# Patient Record
Sex: FEMALE | Race: BLACK OR AFRICAN AMERICAN | NOT HISPANIC OR LATINO | Employment: UNEMPLOYED | URBAN - METROPOLITAN AREA
[De-identification: names, ages, dates, MRNs, and addresses within clinical notes are randomized per-mention and may not be internally consistent; named-entity substitution may affect disease eponyms.]

---

## 2018-10-24 ENCOUNTER — HOSPITAL ENCOUNTER (EMERGENCY)
Facility: HOSPITAL | Age: 28
Discharge: HOME | End: 2018-10-24
Attending: EMERGENCY MEDICINE
Payer: MEDICAID

## 2018-10-24 VITALS
WEIGHT: 174 LBS | RESPIRATION RATE: 18 BRPM | TEMPERATURE: 98 F | OXYGEN SATURATION: 100 % | SYSTOLIC BLOOD PRESSURE: 135 MMHG | DIASTOLIC BLOOD PRESSURE: 77 MMHG | HEART RATE: 97 BPM

## 2018-10-24 DIAGNOSIS — S09.90XA CLOSED HEAD INJURY, INITIAL ENCOUNTER: Primary | ICD-10-CM

## 2018-10-24 DIAGNOSIS — S00.81XA ABRASION OF FACE, INITIAL ENCOUNTER: ICD-10-CM

## 2018-10-24 PROCEDURE — 99282 EMERGENCY DEPT VISIT SF MDM: CPT | Mod: ,,, | Performed by: EMERGENCY MEDICINE

## 2018-10-24 PROCEDURE — 99282 EMERGENCY DEPT VISIT SF MDM: CPT

## 2018-10-24 NOTE — ED NOTES
Patient identifiers verified and correct for Ms Ramos  C/C: Head Injury Saturday  APPEARANCE: awake and alert in NAD.  SKIN: warm, dry and intact. No breakdown or bruising.  MUSCULOSKELETAL: Patient moving all extremities spontaneously, no obvious swelling or deformities noted. Ambulates independently.  RESPIRATORY: Denies shortness of breath.Respirations unlabored.   CARDIAC: Denies CP, 2+ distal pulses; no peripheral edema  ABDOMEN: S/ND/NT, Denies nausea  : voids spontaneously, denies difficulty  Neurologic: AAO x 4; follows commands equal strength in all extremities; denies numbness/tingling. Denies dizziness Denies headache or nausea,

## 2018-10-24 NOTE — ED PROVIDER NOTES
Encounter Date: 10/24/2018    SCRIBE #1 NOTE: I, Hernandez Sparrow, am scribing for, and in the presence of,  Dr. Cuevas. I have scribed the entire note.       History     Chief Complaint   Patient presents with    Head Injury     hit left side of head on car door saturday, denies LOC, currently reports pain to site. Denies being on blood thinners.      The patient is a 28 y.o. female with no known co-morbidities, who presents to the ED with a complaint of headache after hitting her head on her car door 4 days ago. She notes a small abrasion above her left eyebrow. She denies LOC but states she felt lightheaded for a few minutes immediately after.         The history is provided by the patient.     Review of patient's allergies indicates:  Allergies not on file  No past medical history on file.  No past surgical history on file.  No family history on file.  Social History     Tobacco Use    Smoking status: Not on file   Substance Use Topics    Alcohol use: Not on file    Drug use: Not on file     Review of Systems   Constitutional: Negative for fever.   HENT: Negative for sore throat.    Eyes: Negative for visual disturbance.   Respiratory: Negative for shortness of breath.    Cardiovascular: Negative for chest pain.   Gastrointestinal: Negative for nausea.   Genitourinary: Negative for dysuria.   Musculoskeletal: Negative for back pain.   Skin: Negative for rash.   Neurological: Positive for headaches. Negative for weakness and light-headedness (resolved).       Physical Exam     Initial Vitals [10/24/18 1701]   BP Pulse Resp Temp SpO2   135/77 97 18 97.7 °F (36.5 °C) 100 %      MAP       --         Physical Exam    Nursing note and vitals reviewed.  Constitutional: She appears well-developed and well-nourished. No distress.   HENT:   Head: Normocephalic and atraumatic.   Mouth/Throat: Oropharynx is clear and moist.   Eyes: Conjunctivae are normal.   Neck: Normal range of motion.   Cardiovascular: Normal rate,  regular rhythm and normal heart sounds.   Pulmonary/Chest: Breath sounds normal. No respiratory distress.   Abdominal: Soft. She exhibits no distension. There is no tenderness.   Musculoskeletal: Normal range of motion.   Neurological: She is alert and oriented to person, place, and time.   Skin: Skin is warm and dry.   0.5 cm abrasion above left eyebrow         ED Course   Procedures  Labs Reviewed - No data to display       Imaging Results    None          Medical Decision Making:   History:   Old Medical Records: I decided to obtain old medical records.  ED Management:  Evaluation of low mechanism closed hit injury with small facial abrasion. No indication of imaging. Recommended motrin and tylenol as needed. Discharged in stable condition.             Scribe Attestation:   Scribe #1: I performed the above scribed service and the documentation accurately describes the services I performed. I attest to the accuracy of the note.               Clinical Impression:   The primary encounter diagnosis was Closed head injury, initial encounter. A diagnosis of Abrasion of face, initial encounter was also pertinent to this visit.      Disposition:   Disposition: Discharged  Condition: Stable                        Usha Cuevas MD  10/24/18 2746

## 2018-10-24 NOTE — ED NOTES
Discharge home, states understanding to follow up as directed. Ambulates out of ED without difficulty.

## 2019-02-06 ENCOUNTER — OFFICE VISIT (OUTPATIENT)
Dept: OTOLARYNGOLOGY | Facility: CLINIC | Age: 29
End: 2019-02-06
Payer: MEDICAID

## 2019-02-06 VITALS
WEIGHT: 171.94 LBS | HEART RATE: 100 BPM | HEIGHT: 61 IN | DIASTOLIC BLOOD PRESSURE: 89 MMHG | BODY MASS INDEX: 32.46 KG/M2 | SYSTOLIC BLOOD PRESSURE: 138 MMHG

## 2019-02-06 DIAGNOSIS — J30.9 ALLERGIC RHINITIS, UNSPECIFIED SEASONALITY, UNSPECIFIED TRIGGER: ICD-10-CM

## 2019-02-06 DIAGNOSIS — K21.9 LARYNGOPHARYNGEAL REFLUX (LPR): ICD-10-CM

## 2019-02-06 DIAGNOSIS — J35.2 ADENOID HYPERTROPHY: ICD-10-CM

## 2019-02-06 DIAGNOSIS — R51.9 FACIAL PAIN: Primary | ICD-10-CM

## 2019-02-06 PROCEDURE — 99205 PR OFFICE/OUTPT VISIT, NEW, LEVL V, 60-74 MIN: ICD-10-PCS | Mod: 25,S$PBB,, | Performed by: OTOLARYNGOLOGY

## 2019-02-06 PROCEDURE — 99999 PR PBB SHADOW E&M-EST. PATIENT-LVL III: CPT | Mod: PBBFAC,,, | Performed by: OTOLARYNGOLOGY

## 2019-02-06 PROCEDURE — 99999 PR PBB SHADOW E&M-EST. PATIENT-LVL III: ICD-10-PCS | Mod: PBBFAC,,, | Performed by: OTOLARYNGOLOGY

## 2019-02-06 PROCEDURE — 31575 DIAGNOSTIC LARYNGOSCOPY: CPT | Mod: PBBFAC | Performed by: OTOLARYNGOLOGY

## 2019-02-06 PROCEDURE — 99213 OFFICE O/P EST LOW 20 MIN: CPT | Mod: PBBFAC | Performed by: OTOLARYNGOLOGY

## 2019-02-06 PROCEDURE — 31575 PR LARYNGOSCOPY, FLEXIBLE; DIAGNOSTIC: ICD-10-PCS | Mod: S$PBB,,, | Performed by: OTOLARYNGOLOGY

## 2019-02-06 PROCEDURE — 99205 OFFICE O/P NEW HI 60 MIN: CPT | Mod: 25,S$PBB,, | Performed by: OTOLARYNGOLOGY

## 2019-02-06 PROCEDURE — 31575 DIAGNOSTIC LARYNGOSCOPY: CPT | Mod: S$PBB,,, | Performed by: OTOLARYNGOLOGY

## 2019-02-07 NOTE — CONSULTS
Ms. Azevedo presents for consultation.    VITAL SIGNS:  Per nurses' notes.    CHIEF COMPLAINT:  Coughing up mucus with blood.    HISTORY OF PRESENT ILLNESS:  This is a 29-year-old black female who states for   the last three months, she has had a mucus build up with postnasal drip and has   been coughing up mucus with blood along with pressure under her eyes and a   positive globus sensation.  She has been treated with several different rounds   of antibiotics as well as steroids.  She is using Flonase.  She is also on   antihistamines.  She states that none of this has helped.  She has not done any   sinus rinses.  Her nasal airway is clear.  She does have positive allergy   symptoms with sneezing, watery itchy eyes, though she has not been evaluated by   an allergist.  Additional symptoms include nasal congestion, the facial pressure   pain and persistent sinus infection.  She lists no other medical or surgical   history.  No family history listed.  She is a nonsmoker, nondrinker.    REVIEW OF SYSTEMS:  CONSTITUTIONAL:  Weight loss or weight gain:  Negative.  ALLERGY/IMMUNOLOGIC:  Negative.  ENT/Mouth:  Hearing Loss/Dizziness/Tinnitus:  Negative.  Ear Infections/Otalgia:  Negative.  Rhinitis/Sinusitis/Epistaxis:  Negative.  Headache/Facial Pain:  Negative.  Nasal Obstruction/Snoring/NILES:  Negative.  Throat: Infections/Pain:  Negative.  Hoarseness/Speech Disturbance:  Negative.  Salivary Glands Disorder:  Negative.  Trauma:  Negative.    Cardiovascular:  M/I Angina:  Negative.  Hypertension:  Negative.  Endo:  DM/Steroids:  Negative.  Eyes:  Negative.  GI: Dysphagia/Reflux:  Negative.  : GYN Pregnancy:  Negative.      Renal:  Dialysis:  Negative.  Lymph:  Neck Mass/Lymphadenopathy:  Negative.  Musculoskeletal:  Negative.  Hem:  Bleeding Disorders/Anemia:  Negative.  Neuro:  Cranial/Neuralgia:  Negative.  Pulm:  Asthma/SOB/Cough:  Negative.  Skin/Breast:  Negative.    PAST MEDICAL/FAMILY/SOCIAL HISTORY:    Past  Medical History:   ENT Surgery:  Negative.   Occupational Exposure:  Negative.    Problems:  Negative.   Cancer:  Negative.    Past Family History:   Family history hearing loss:  Negative.   Family history cancer:  Negative.    Past Social History:   Tobacco:  Negative.   Alcohol:  Negative.    MEDICATIONS:  Per MedCard.    ALLERGIES:  Per MedCard.    PHYSICAL EXAMINATION:  General Appearance:  Well-developed, well-nourished 29-year-old black female in   no apparent distress.  Communication Ability:  Good.  EARS, NOSE, THROAT, MOUTH;  EARS:   External auditory canals:  Clear.   Hearing:  Grossly Intact.   Tympanic membranes:  Clear.  NOSE:   External:  Shows good valvular support.   Intranasal:  Septum is straight.  Turbinates are 1 to 2+.  Exam with flexible   fiberoptic scope #[id number] reveals no intranasal polyps or purulence.  Airway is   clear.  She does have 1-2+ adenoid hypertrophy.  MOUTH:   Intraorally:  Lips, teeth and gums: Normal.   Oropharynx:  Normal.   Mucosa:  Normal.  THROAT:   Tongue:  Normal.   Palate:  She has slightly elongated.  Her aryepiglottic folds do show some   mild-to-moderate edema and erythema consistent with LPR.  Vocal cords show   normal mobility.  No masses or lesions noted.   Tonsils:  1+.   Posterior pharynx:  Normal.  HEAD/FACE INSPECTION:  Normal and atraumatic.   Palpation/Percussion:  She is tender to percussion in the ethmoid and   particularly the maxillary regions bilaterally.   Facial Strength:  Normal and symmetric.   Salivary glands:  Normal.    NECK:  Supple.  THYROID:  No masses.  LYMPHATICS:  No nodes.  RESPIRATORY:   Effort:  Normal.  EYES:   Ocular Mobility:  Normal.   Vision:  Grossly intact.  NEURO/PSYCH:   Cranial nerves:  2-12 grossly intact.   Orientation:  x3.   Mood/Affect: Normal.    IMPRESSION:  A 29-year-old black female with chronic facial pressure pains as   well as coughing up bloody mucus for the last three months despite antibiotic   and  steroid treatments.    RECOMMENDATION:  I discussed my findings with her in detail as well as my   recommendations for treatment.  I have suggested sinus rinses utilizing   distilled water and I have given her a literature on this.  I have also asked   her to continue with her Flonase.  I have suggested discontinuing her   antihistamines.  We will order a CT scan of her sinuses for evaluation.  She   will contact us via the portal to arrange for followup for this.  I have also   given her reflux precautions and discussed the use of trial period of omeprazole   for this.  I suggested she followup for this with her primary or GI doctor.    Again, she will contact us after her scan is completed to arrange for followup   of this.      HG/HN  dd: 02/06/2019 09:34:49 (CST)  td: 02/07/2019 03:07:03 (CST)  Doc ID   #[id number]  Job ID #[id number]    CC:

## 2021-12-26 ENCOUNTER — HOSPITAL ENCOUNTER (EMERGENCY)
Facility: HOSPITAL | Age: 31
Discharge: HOME | End: 2021-12-26
Attending: EMERGENCY MEDICINE
Payer: MEDICAID

## 2021-12-26 VITALS
OXYGEN SATURATION: 100 % | DIASTOLIC BLOOD PRESSURE: 84 MMHG | WEIGHT: 145 LBS | SYSTOLIC BLOOD PRESSURE: 143 MMHG | TEMPERATURE: 99 F | RESPIRATION RATE: 17 BRPM | HEART RATE: 80 BPM | HEIGHT: 63 IN | BODY MASS INDEX: 25.69 KG/M2

## 2021-12-26 DIAGNOSIS — U07.1 COVID: Primary | ICD-10-CM

## 2021-12-26 LAB
CTP QC/QA: YES
SARS-COV-2 RDRP RESP QL NAA+PROBE: POSITIVE

## 2021-12-26 PROCEDURE — U0002 COVID-19 LAB TEST NON-CDC: HCPCS | Performed by: EMERGENCY MEDICINE

## 2021-12-26 PROCEDURE — 99283 EMERGENCY DEPT VISIT LOW MDM: CPT | Mod: CR,CS,, | Performed by: EMERGENCY MEDICINE

## 2021-12-26 PROCEDURE — 99282 EMERGENCY DEPT VISIT SF MDM: CPT | Mod: 25

## 2021-12-26 PROCEDURE — 99283 PR EMERGENCY DEPT VISIT,LEVEL III: ICD-10-PCS | Mod: CR,CS,, | Performed by: EMERGENCY MEDICINE

## 2021-12-26 NOTE — Clinical Note
"Jayla "Reynaldo Azevedo was seen and treated in our emergency department on 12/26/2021.     COVID-19 is present in our communities across the state. There is limited testing for COVID at this time, so not all patients can be tested. In this situation, your employee meets the following criteria:    Jayla Azevedo has met the criteria for COVID-19 testing and has a POSITIVE result. She can return to work once they are asymptomatic for 72 hours without the use of fever reducing medications AND at least ten days from the first positive result.     If you have any questions or concerns, or if I can be of further assistance, please do not hesitate to contact me.    Sincerely,             Janene Hernandez MD"

## 2021-12-26 NOTE — ED PROVIDER NOTES
"Date of service: 12/26/2021  Time: 4:52 PM    CC:  Chief Complaint   Patient presents with    COVID-19 Concerns     + exposure. Mom and sister tested +        HPI:    31 y.o. female presents with no symptoms but multiple exposures to covid from people she lives with. They have not been tested for COVID yet.    Vaccination status: unvaccinated  Associated symptoms: none  Exacerbating factors: none  Relieving factors: none.  Relevant comorbidities: none      ROS:  Review of Systems   Constitutional: Negative for chills, fever and malaise/fatigue.   HENT: Negative for congestion and sore throat.    Eyes: Negative for blurred vision, pain, discharge and redness.   Respiratory: Negative for cough and shortness of breath.    Cardiovascular: Negative for chest pain.   Gastrointestinal: Negative for abdominal pain, diarrhea, heartburn, melena, nausea and vomiting.   Musculoskeletal: Negative for myalgias.   Neurological: Negative for dizziness, seizures, loss of consciousness, weakness and headaches.       PMH/PSH:  No past medical history on file.  No past surgical history on file.    MEDS  No current facility-administered medications for this encounter.  No current outpatient medications on file.    Allergies:  Review of patient's allergies indicates:  No Known Allergies    PHYSICAL EXAM:  BP (!) 143/84 (BP Location: Left arm, Patient Position: Sitting)   Pulse 80   Temp 99.4 °F (37.4 °C) (Oral)   Resp 17   Ht 5' 3" (1.6 m)   Wt 65.8 kg (145 lb)   SpO2 100%   BMI 25.69 kg/m²   Gen: AxOx4, NAD, appears stated age, well appearing  Eye: EOMI, no scleral icterus, no periorbital edema or ecchymosis  Head: Normocephalic, atraumatic, no lesions, scalp grossly normal  ENT: neck supple, no stridor, no masses, no abnormal phonation or drooling  CVS: warm and well perfused, cap refill <2 seconds, no extremity pallor  PULM: normal work of breathing, no stridor, equal chest rise, no peripheral cyanosis, unable to auscultate " lungs 2/2 poor quality precautions stethoscope  ABD: scaphoid, nondistended  Ext: no rash, no deformities, moving all joints with normal ROM  Neuro: PAVON, gait intact, face grossly symmetric  Psych: well groomed, mood and affect congruent, makes good eye contact, cooperative      WORKUP:  Labs Reviewed   SARS-COV-2 RDRP GENE - Abnormal; Notable for the following components:       Result Value    POC Rapid COVID Positive (*)     All other components within normal limits    Narrative:     This test utilizes isothermal nucleic acid amplification   technology to detect the SARS-CoV-2 RdRp nucleic acid segment.   The analytical sensitivity (limit of detection) is 125 genome   equivalents/mL.   A POSITIVE result implies infection with the SARS-CoV-2 virus;   the patient is presumed to be contagious.     A NEGATIVE result means that SARS-CoV-2 nucleic acids are not   present above the limit of detection. A NEGATIVE result should be   treated as presumptive. It does not rule out the possibility of   COVID-19 and should not be the sole basis for treatment decisions.   If COVID-19 is strongly suspected based on clinical and exposure   history, re-testing using an alternate molecular assay should be   considered.   This test is only for use under the Food and Drug   Administration s Emergency Use Authorization (EUA).   Commercial kits are provided by [employer].   Performance characteristics of the EUA have been independently   verified by [facility] Department of   Pathology and Laboratory Medicine.   _________________________________________________________________   The authorized Fact Sheet for Healthcare Providers and the authorized Fact   Sheet for Patients of the ID NOW COVID-19 are available on the FDA   website:     https://www.fda.gov/media/091566/download  https://www.fda.gov/media/742757/download           No orders to display         MDM:   I decided to obtain medical records from prior clinic  visits and hospitalizations.     Patient's complaint concerning for COVID infection vs well state. Patient evaluated during a surge of the ongoing coronavirus pandemic.  .      They are well appearing, hemodynamically stable, without increased work of breathing.  No severe hypoxia to necessitate hospitalization, including with ambulation.    Labs notable for positive covid.        Discharge plan:       Patient was counseled regarding respiratory supportive care measures.I have enrolled them in the covid monitoring program, and provided them with a home pulse oximeter, and instructed on proper use - specifically to check home pulse ox at least BID and to return if sats at rest or with ambulation are consistently below 93% (our current hospitalization criteria).     I discussed Monocolonal antibody infusion with the patient given they meet criteria, and placed the referral.     Patient advised to self quarantine, wear mask in public, perform hand hygiene, etc.    Discharged home in stable condition. Return precautions discussed at bedside.    Final diagnoses:  [U07.1] COVID (Primary)       ED Disposition Condition    Discharge Stable               Janene Hernandez MD  12/26/21 2974

## 2021-12-26 NOTE — ED NOTES
Jayla Azevedo, a 31 y.o. female presents to the ED w/ complaint of COVID-19. Pt states family members in house hold started having symptoms yesterday and tested positive today; came in to be tested, asymptomatic.     Triage note:  Chief Complaint   Patient presents with    COVID-19 Concerns     + exposure. Mom and sister tested +      Review of patient's allergies indicates:  No Known Allergies  No past medical history on file.      LOC: The patient is awake, alert, and oriented to self, place, time, and situation. Pt is calm and cooperative. Affect is appropriate.  Speech is appropriate and clear.     APPEARANCE: Patient resting comfortably in no acute distress.  Patient is clean and well groomed.    SKIN: The skin is warm and dry; color consistent with ethnicity.  Patient has normal skin turgor and moist mucus membranes.  Skin intact; no breakdown or bruising noted.     MUSCULOSKELETAL: Patient moving upper and lower extremities without difficulty; denies pain in the extremities or back.  Denies weakness.     RESPIRATORY: Airway is open and patent. Respirations spontaneous, even, easy, and non-labored.  Patient has a normal effort and rate.  No accessory muscle use noted. Denies cough.     CARDIAC:  Normal rate noted.  No peripheral edema noted. No complaints of chest pain.     ABDOMEN: Soft and non tender to palpation.  No distention noted. Pt denies abdominal pain; denies nausea, vomiting, diarrhea, or constipation.    NEUROLOGIC: Eyes open spontaneously.  Behavior appropriate to situation.  Follows commands; facial expression symmetrical.  Purposeful motor response noted; normal sensation in all extremities. Pt denies headache; denies lightheadedness or dizziness; denies visual disturbances; denies loss of balance; denies unilateral weakness.

## 2023-01-23 ENCOUNTER — OFFICE VISIT (OUTPATIENT)
Dept: PRIMARY CARE CLINIC | Facility: CLINIC | Age: 33
End: 2023-01-23
Payer: MEDICAID

## 2023-01-23 DIAGNOSIS — Z00.00 ANNUAL PHYSICAL EXAM: Primary | ICD-10-CM

## 2023-01-23 DIAGNOSIS — J30.9 ALLERGIC RHINITIS, UNSPECIFIED SEASONALITY, UNSPECIFIED TRIGGER: ICD-10-CM

## 2023-01-23 PROCEDURE — 1160F RVW MEDS BY RX/DR IN RCRD: CPT | Mod: CPTII,95,, | Performed by: FAMILY MEDICINE

## 2023-01-23 PROCEDURE — 1159F PR MEDICATION LIST DOCUMENTED IN MEDICAL RECORD: ICD-10-PCS | Mod: CPTII,95,, | Performed by: FAMILY MEDICINE

## 2023-01-23 PROCEDURE — 99385 PR PREVENTIVE VISIT,NEW,18-39: ICD-10-PCS | Mod: 95,,, | Performed by: FAMILY MEDICINE

## 2023-01-23 PROCEDURE — 99385 PREV VISIT NEW AGE 18-39: CPT | Mod: 95,,, | Performed by: FAMILY MEDICINE

## 2023-01-23 PROCEDURE — 1160F PR REVIEW ALL MEDS BY PRESCRIBER/CLIN PHARMACIST DOCUMENTED: ICD-10-PCS | Mod: CPTII,95,, | Performed by: FAMILY MEDICINE

## 2023-01-23 PROCEDURE — 1159F MED LIST DOCD IN RCRD: CPT | Mod: CPTII,95,, | Performed by: FAMILY MEDICINE

## 2023-01-23 RX ORDER — FLUTICASONE PROPIONATE 50 MCG
2 SPRAY, SUSPENSION (ML) NASAL 2 TIMES DAILY PRN
Qty: 16 G | Refills: 2 | Status: SHIPPED | OUTPATIENT
Start: 2023-01-23 | End: 2023-11-02

## 2023-01-23 RX ORDER — LORATADINE 10 MG/1
10 TABLET ORAL DAILY
Qty: 90 TABLET | Refills: 2 | Status: SHIPPED | OUTPATIENT
Start: 2023-01-23 | End: 2024-01-23

## 2023-01-23 NOTE — PROGRESS NOTES
The patient location is: [facility]  The chief complaint leading to consultation is:  Establish care    Visit type: audiovisual    Face to Face time with patient: 10   minutes of total time spent on the encounter, which includes face to face time and non-face to face time preparing to see the patient (eg, review of tests), Obtaining and/or reviewing separately obtained history, Documenting clinical information in the electronic or other health record, Independently interpreting results (not separately reported) and communicating results to the patient/family/caregiver, or Care coordination (not separately reported).         Each patient to whom he or she provides medical services by telemedicine is:  (1) informed of the relationship between the physician and patient and the respective role of any other health care provider with respect to management of the patient; and (2) notified that he or she may decline to receive medical services by telemedicine and may withdraw from such care at any time.    Notes:     Clinic Note  1/23/2023      Subjective:       Patient ID:  Jayla is a 32 y.o. female being seen for an new visit.      Chief Complaint:  Establish care     Establish care-patient with a history of allergic rhinitis here to establish care.  Has not had a Pap smear in a long time    Allergic rhinitis-takes Claritin and Flonase for this which helps.  Last week had an upper URI which has since resolved      No family history on file.  Social History     Socioeconomic History    Marital status: Single   Tobacco Use    Smoking status: Never    Smokeless tobacco: Never   Substance and Sexual Activity    Alcohol use: No    Drug use: No     No past surgical history on file.    There is no problem list on file for this patient.    Review of Systems   Constitutional:  Negative for chills, fever, malaise/fatigue and weight loss.   HENT:  Negative for congestion, sinus pain and sore throat.    Respiratory:  Negative for  "cough, shortness of breath and wheezing.    Cardiovascular:  Negative for chest pain and palpitations.   Gastrointestinal:  Negative for constipation, diarrhea, nausea and vomiting.   Genitourinary:  Negative for dysuria, frequency and urgency.   Musculoskeletal:  Negative for myalgias.   Skin:  Negative for rash.   Neurological:  Negative for headaches.       Objective:      There were no vitals taken for this visit.  Estimated body mass index is 25.69 kg/m² as calculated from the following:    Height as of 12/26/21: 5' 3" (1.6 m).    Weight as of 12/26/21: 65.8 kg (145 lb).  Physical Exam  Constitutional:       General: She is not in acute distress.     Appearance: She is not diaphoretic.   HENT:      Head: Normocephalic and atraumatic.   Eyes:      Conjunctiva/sclera: Conjunctivae normal.   Pulmonary:      Effort: Pulmonary effort is normal.   Musculoskeletal:         General: Normal range of motion.   Neurological:      Mental Status: She is alert and oriented to person, place, and time.   Psychiatric:         Mood and Affect: Mood and affect normal.         Cognition and Memory: Memory normal.         Judgment: Judgment normal.         Assessment and Plan:     1. Annual physical exam  - patient will schedule another visit for well-woman exam    2. Allergic rhinitis, unspecified seasonality, unspecified trigger  - loratadine (CLARITIN) 10 mg tablet; Take 1 tablet (10 mg total) by mouth once daily.  Dispense: 90 tablet; Refill: 2  - fluticasone propionate (FLONASE) 50 mcg/actuation nasal spray; 2 sprays (100 mcg total) by Each Nostril route 2 (two) times daily as needed for Rhinitis or Allergies.  Dispense: 16 g; Refill: 2        Follow up:   No follow-ups on file.     Other Orders Placed This Visit:  No orders of the defined types were placed in this encounter.          Chris Ley MD        This note is dictated on M*Modal word recognition program.  There are word recognition mistakes that are occasionally " missed on review.

## 2023-04-12 ENCOUNTER — PATIENT MESSAGE (OUTPATIENT)
Dept: ADMINISTRATIVE | Facility: HOSPITAL | Age: 33
End: 2023-04-12
Payer: MEDICAID

## 2023-08-11 ENCOUNTER — OFFICE VISIT (OUTPATIENT)
Dept: PRIMARY CARE CLINIC | Facility: CLINIC | Age: 33
End: 2023-08-11
Payer: MEDICAID

## 2023-08-11 DIAGNOSIS — G44.209 TENSION HEADACHE: ICD-10-CM

## 2023-08-11 DIAGNOSIS — G47.00 INSOMNIA, UNSPECIFIED TYPE: Primary | ICD-10-CM

## 2023-08-11 PROCEDURE — 99214 OFFICE O/P EST MOD 30 MIN: CPT | Mod: 95,,, | Performed by: FAMILY MEDICINE

## 2023-08-11 PROCEDURE — 99214 PR OFFICE/OUTPT VISIT, EST, LEVL IV, 30-39 MIN: ICD-10-PCS | Mod: 95,,, | Performed by: FAMILY MEDICINE

## 2023-08-11 RX ORDER — TRAZODONE HYDROCHLORIDE 50 MG/1
50 TABLET ORAL NIGHTLY
Qty: 60 TABLET | Refills: 1 | Status: SHIPPED | OUTPATIENT
Start: 2023-08-11 | End: 2023-10-06

## 2023-08-11 NOTE — PROGRESS NOTES
The patient location is: work  The chief complaint leading to consultation is: headaches and insomnia    Visit type: audiovisual    Face to Face time with patient: 20 minutes of total time spent on the encounter, which includes face to face time and non-face to face time preparing to see the patient (eg, review of tests), Obtaining and/or reviewing separately obtained history, Documenting clinical information in the electronic or other health record, Independently interpreting results (not separately reported) and communicating results to the patient/family/caregiver, or Care coordination (not separately reported).         Each patient to whom he or she provides medical services by telemedicine is:  (1) informed of the relationship between the physician and patient and the respective role of any other health care provider with respect to management of the patient; and (2) notified that he or she may decline to receive medical services by telemedicine and may withdraw from such care at any time.    Notes:         Clinic Note  8/11/2023      Subjective:       Patient ID:  Jayla is a 33 y.o. female being seen for an established visit.    Chief Complaint:  Headaches and insomnia     Headaches-patient reports waking up with a headache daily over the last week.  Symptoms will usually improve after taking Tylenol or ibuprofen and lying down.  Reports very mild/few intermittent headaches during the day.  Headaches described as anterior bilaterally.  Denies photophobia, phonophobia, nausea, vomiting.  Does not exercise, has been sleeping poorly does have chronic allergic rhinitis and takes Claritin and Flonase, no increased nasal congestion.  Denies any headaches with movement/motion, sneezing, coughing, no numbness or tingling weakness    Insomnia-has been having significant insomnia over the last several months.  Will try to go to bed at 10:30 p.m. every day and will have to wake up at 4:00 a.m. in the morning for work.   "Has trouble with falling asleep and staying asleep.  On the weekends will wake up at 7:00 a.m..  Does not take naps during the day.  Infrequently drinks coffee.  Does not drink tea or soda.  Does watch TV and use her phone prior to going to bed.  Melatonin not helpful.  Not stressed or anxious.  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   HENT:  Negative for congestion, sinus pain and sore throat.    Respiratory:  Negative for cough, shortness of breath and wheezing.    Cardiovascular:  Negative for chest pain and palpitations.   Gastrointestinal:  Negative for constipation, diarrhea, nausea and vomiting.   Genitourinary:  Negative for dysuria, frequency and urgency.   Musculoskeletal:  Negative for myalgias.   Skin:  Negative for rash.   Neurological:  Positive for headaches. Negative for dizziness, tingling, tremors, sensory change, speech change, focal weakness, seizures, loss of consciousness and weakness.   Psychiatric/Behavioral:  Negative for depression and hallucinations. The patient has insomnia. The patient is not nervous/anxious.        Medication List with Changes/Refills   New Medications    TRAZODONE (DESYREL) 50 MG TABLET    Take 1 tablet (50 mg total) by mouth every evening.   Current Medications    FLUTICASONE PROPIONATE (FLONASE) 50 MCG/ACTUATION NASAL SPRAY    2 sprays (100 mcg total) by Each Nostril route 2 (two) times daily as needed for Rhinitis or Allergies.    LORATADINE (CLARITIN) 10 MG TABLET    Take 1 tablet (10 mg total) by mouth once daily.       There is no problem list on file for this patient.          Objective:      There were no vitals taken for this visit.  Estimated body mass index is 25.69 kg/m² as calculated from the following:    Height as of 12/26/21: 5' 3" (1.6 m).    Weight as of 12/26/21: 65.8 kg (145 lb).  Physical Exam  Constitutional:       " General: She is not in acute distress.     Appearance: She is not diaphoretic.   HENT:      Head: Normocephalic and atraumatic.   Eyes:      Conjunctiva/sclera: Conjunctivae normal.   Pulmonary:      Effort: Pulmonary effort is normal.   Musculoskeletal:         General: Normal range of motion.   Neurological:      Mental Status: She is alert and oriented to person, place, and time.   Psychiatric:         Mood and Affect: Mood and affect normal.         Behavior: Behavior normal.         Thought Content: Thought content normal.         Cognition and Memory: Memory normal.         Judgment: Judgment normal.           Assessment and Plan:     1. Insomnia, unspecified type  - discussed importance of sleep hygiene, trying to wake up at the same time every morning, going to bed earlier in the night so that she can get at least 7 hours of sleep, avoiding screen time prior to bedtime, exercising.  Start trazodone 50 mg for now, can titrate up as needed  - traZODone (DESYREL) 50 MG tablet; Take 1 tablet (50 mg total) by mouth every evening.  Dispense: 60 tablet; Refill: 1    2. Tension headache  - discussed with patient likely tension headaches in etiology.  Recommend Tylenol/ibuprofen p.r.n., can also do a trial of Excedrin.  No red flags on history exam, will monitor for now      Follow Up:   No follow-ups on file.    Other Orders Placed This Visit:  No orders of the defined types were placed in this encounter.        Chris Ley MD        This note is dictated on M*Modal word recognition program.  There are word recognition mistakes that are occasionally missed on review.

## 2023-10-03 ENCOUNTER — PATIENT MESSAGE (OUTPATIENT)
Dept: PRIMARY CARE CLINIC | Facility: CLINIC | Age: 33
End: 2023-10-03
Payer: MEDICAID

## 2023-10-06 ENCOUNTER — OFFICE VISIT (OUTPATIENT)
Dept: PRIMARY CARE CLINIC | Facility: CLINIC | Age: 33
End: 2023-10-06
Payer: MEDICAID

## 2023-10-06 DIAGNOSIS — R05.9 COUGH, UNSPECIFIED TYPE: ICD-10-CM

## 2023-10-06 DIAGNOSIS — G47.00 INSOMNIA, UNSPECIFIED TYPE: Primary | ICD-10-CM

## 2023-10-06 PROCEDURE — 99213 OFFICE O/P EST LOW 20 MIN: CPT | Mod: 95,,, | Performed by: FAMILY MEDICINE

## 2023-10-06 PROCEDURE — 99213 PR OFFICE/OUTPT VISIT, EST, LEVL III, 20-29 MIN: ICD-10-PCS | Mod: 95,,, | Performed by: FAMILY MEDICINE

## 2023-10-06 RX ORDER — PROMETHAZINE HYDROCHLORIDE AND DEXTROMETHORPHAN HYDROBROMIDE 6.25; 15 MG/5ML; MG/5ML
5 SYRUP ORAL EVERY 4 HOURS PRN
Qty: 118 ML | Refills: 0 | Status: SHIPPED | OUTPATIENT
Start: 2023-10-06 | End: 2023-10-16

## 2023-10-06 RX ORDER — TRAZODONE HYDROCHLORIDE 150 MG/1
150 TABLET ORAL NIGHTLY
Qty: 90 TABLET | Refills: 3 | Status: SHIPPED | OUTPATIENT
Start: 2023-10-06 | End: 2024-10-05

## 2023-10-06 NOTE — PROGRESS NOTES
The patient location is: [place]  The chief complaint leading to consultation is: sleep    Visit type: audiovisual    Face to Face time with patient: 15 minutes of total time spent on the encounter, which includes face to face time and non-face to face time preparing to see the patient (eg, review of tests), Obtaining and/or reviewing separately obtained history, Documenting clinical information in the electronic or other health record, Independently interpreting results (not separately reported) and communicating results to the patient/family/caregiver, or Care coordination (not separately reported).         Each patient to whom he or she provides medical services by telemedicine is:  (1) informed of the relationship between the physician and patient and the respective role of any other health care provider with respect to management of the patient; and (2) notified that he or she may decline to receive medical services by telemedicine and may withdraw from such care at any time.    Notes:       Clinic Note  10/6/2023      Subjective:       Patient ID:  Jayla is a 33 y.o. female being seen for an established visit.    Chief Complaint:  Sleep    Insomnia-currently taking trazodone q.h.s. which does help with falling asleep and staying asleep.  Would like to increase the dose if possible.    Cough-reports having cough over the last 2 weeks along with rhinorrhea and congestion.  Patient does have chronic allergic rhinitis and using her Flonase and loratadine.  Her cough overall is improving, however did have significant cough episode last night that woke her up from sleeping    Review of Systems   Constitutional:  Negative for chills, fever, malaise/fatigue and weight loss.   HENT:  Negative for congestion, sinus pain and sore throat.    Respiratory:  Positive for cough. Negative for shortness of breath and wheezing.    Cardiovascular:  Negative for chest pain and palpitations.   Gastrointestinal:  Negative for  "constipation, diarrhea, nausea and vomiting.   Genitourinary:  Negative for dysuria, frequency and urgency.   Musculoskeletal:  Negative for myalgias.   Skin:  Negative for rash.   Neurological:  Negative for headaches.   Psychiatric/Behavioral:  The patient has insomnia.        Medication List with Changes/Refills   New Medications    PROMETHAZINE-DEXTROMETHORPHAN (PROMETHAZINE-DM) 6.25-15 MG/5 ML SYRP    Take 5 mLs by mouth every 4 (four) hours as needed (cough).    TRAZODONE (DESYREL) 150 MG TABLET    Take 1 tablet (150 mg total) by mouth every evening.   Current Medications    FLUTICASONE PROPIONATE (FLONASE) 50 MCG/ACTUATION NASAL SPRAY    2 sprays (100 mcg total) by Each Nostril route 2 (two) times daily as needed for Rhinitis or Allergies.    LORATADINE (CLARITIN) 10 MG TABLET    Take 1 tablet (10 mg total) by mouth once daily.   Discontinued Medications    TRAZODONE (DESYREL) 50 MG TABLET    Take 1 tablet (50 mg total) by mouth every evening.       There is no problem list on file for this patient.          Objective:      There were no vitals taken for this visit.  Estimated body mass index is 25.69 kg/m² as calculated from the following:    Height as of 12/26/21: 5' 3" (1.6 m).    Weight as of 12/26/21: 65.8 kg (145 lb).  Physical Exam  Constitutional:       General: She is not in acute distress.     Appearance: She is not diaphoretic.   HENT:      Head: Normocephalic and atraumatic.   Eyes:      Conjunctiva/sclera: Conjunctivae normal.   Pulmonary:      Effort: Pulmonary effort is normal.   Musculoskeletal:         General: Normal range of motion.   Neurological:      Mental Status: She is alert and oriented to person, place, and time.   Psychiatric:         Mood and Affect: Mood and affect normal.         Behavior: Behavior normal.         Thought Content: Thought content normal.         Cognition and Memory: Memory normal.         Judgment: Judgment normal.           Assessment and Plan:     1. " Insomnia, unspecified type  - overall improving but will increase to trazodone 150 mg q.h.s.  - traZODone (DESYREL) 150 MG tablet; Take 1 tablet (150 mg total) by mouth every evening.  Dispense: 90 tablet; Refill: 3    2. Cough, unspecified type  - cough could be due to viral cough, post viral cough, cough from postnasal drip from allergic rhinitis  - promethazine-dextromethorphan (PROMETHAZINE-DM) 6.25-15 mg/5 mL Syrp; Take 5 mLs by mouth every 4 (four) hours as needed (cough).  Dispense: 118 mL; Refill: 0        Follow Up:   No follow-ups on file.    Other Orders Placed This Visit:  No orders of the defined types were placed in this encounter.        Chris Ley MD        This note is dictated on M*Modal word recognition program.  There are word recognition mistakes that are occasionally missed on review.

## 2023-11-02 DIAGNOSIS — J30.9 ALLERGIC RHINITIS, UNSPECIFIED SEASONALITY, UNSPECIFIED TRIGGER: ICD-10-CM

## 2023-11-02 RX ORDER — FLUTICASONE PROPIONATE 50 MCG
SPRAY, SUSPENSION (ML) NASAL
Qty: 48 G | Refills: 3 | Status: SHIPPED | OUTPATIENT
Start: 2023-11-02

## 2023-11-02 NOTE — TELEPHONE ENCOUNTER
Refill Decision Note   Jayla Roberto Carlos  is requesting a refill authorization.  Brief Assessment and Rationale for Refill:  Approve     Medication Therapy Plan:         Comments:     Note composed:1:50 PM 11/02/2023

## 2023-11-02 NOTE — TELEPHONE ENCOUNTER
No care due was identified.  [facility] Embedded Care Due Messages. Reference number: [id number].   11/02/2023 8:02:35 AM CDT

## 2023-12-07 ENCOUNTER — PATIENT OUTREACH (OUTPATIENT)
Dept: ADMINISTRATIVE | Facility: HOSPITAL | Age: 33
End: 2023-12-07
Payer: MEDICAID

## 2023-12-07 NOTE — LETTER
December 7, 2023    Jayla Azevedo  [address] [place] [address]             Dear Jayla    In addition to helping you feel better when you are sick, we are interested in preventing illness and injury in the first place.  Screening tests and routine follow up tests when you have certain medical problems are very essential in helping you stay as healthy as possible. In the spirit of maintaining your health, our system indicates that you are due for the following:    Health Maintenance Due   Topic Date Due    Hepatitis C Screening  Never done    Cervical Cancer Screening  Never done    Lipid Panel  Never done    HIV Screening  Never done    TETANUS VACCINE  02/12/2018    Influenza Vaccine (1) 09/01/2023    COVID-19 Vaccine (2 - 2023-24 season) 09/01/2023        Please be prepared to discuss these recommended tests at your next visit.  If you haven't had a visit within the past one year please call [phone] to schedule or request an appointment.    Sincerely,       Your Health Care Team

## 2023-12-07 NOTE — PROGRESS NOTES
Health Maintenance Due   Topic Date Due    Hepatitis C Screening  Never done    Cervical Cancer Screening  Never done    Lipid Panel  Never done    HIV Screening  Never done    TETANUS VACCINE  02/12/2018    Influenza Vaccine (1) 09/01/2023    COVID-19 Vaccine (2 - 2023-24 season) 09/01/2023       LVM TO CONTACT THE OFFICE TO SCHEDULE CERVICAL EXAM.   
none

## 2024-01-17 ENCOUNTER — PATIENT OUTREACH (OUTPATIENT)
Dept: ADMINISTRATIVE | Facility: HOSPITAL | Age: 34
End: 2024-01-17
Payer: MEDICAID

## 2024-01-17 DIAGNOSIS — Z11.4 SCREENING FOR HIV (HUMAN IMMUNODEFICIENCY VIRUS): ICD-10-CM

## 2024-01-17 DIAGNOSIS — Z11.59 SCREENING FOR VIRAL DISEASE: Primary | ICD-10-CM

## 2024-01-17 DIAGNOSIS — Z13.220 SCREENING CHOLESTEROL LEVEL: ICD-10-CM

## 2024-02-20 ENCOUNTER — PATIENT OUTREACH (OUTPATIENT)
Dept: ADMINISTRATIVE | Facility: HOSPITAL | Age: 34
End: 2024-02-20
Payer: MEDICAID

## 2024-02-20 LAB
CHLAMYDIA CULTURE: NORMAL
PAP RECOMMENDATION EXT: NORMAL
PAP RECOMMENDATION EXT: NORMAL

## 2024-02-20 NOTE — PROGRESS NOTES
Health Maintenance Due   Topic Date Due    Hepatitis C Screening  Never done    Lipid Panel  Never done    HIV Screening  Never done    TETANUS VACCINE  02/12/2018    Influenza Vaccine (1) 09/01/2023    COVID-19 Vaccine (2 - 2023-24 season) 09/01/2023     [id number]/22/2001

## 2024-09-13 ENCOUNTER — PATIENT OUTREACH (OUTPATIENT)
Dept: ADMINISTRATIVE | Facility: HOSPITAL | Age: 34
End: 2024-09-13
Payer: MEDICAID

## 2024-09-13 LAB
HEP C VIRUS AB: NORMAL
HIV: NORMAL

## 2024-09-13 NOTE — LETTER
September 13, 2024    Jayla Azevedo  [address] [place] [address]             Dear Jayal    In addition to helping you feel better when you are sick, we are interested in preventing illness and injury in the first place.  Screening tests and routine follow up tests when you have certain medical problems are very essential in helping you stay as healthy as possible. In the spirit of maintaining your health, our system indicates that you are due for the following:    Health Maintenance Due   Topic Date Due    Lipid Panel  Never done    TETANUS VACCINE  02/12/2018    Influenza Vaccine (1) 09/01/2024    COVID-19 Vaccine (2 - 2023-24 season) 09/01/2024        Please be prepared to discuss these recommended tests at your next visit.  If you haven't had a visit within the past one year please call [phone] to schedule or request an appointment.    Sincerely,       Your Health Care Team

## 2024-09-13 NOTE — PROGRESS NOTES
Health Maintenance Due   Topic Date Due    Lipid Panel  Never done    TETANUS VACCINE  02/12/2018    Influenza Vaccine (1) 09/01/2024    COVID-19 Vaccine (2 - 2023-24 season) 09/01/2024     LVM to schedule annual, mailed reminder.

## 2024-10-23 DIAGNOSIS — G47.00 INSOMNIA, UNSPECIFIED TYPE: ICD-10-CM

## 2024-10-23 RX ORDER — TRAZODONE HYDROCHLORIDE 150 MG/1
150 TABLET ORAL
Qty: 90 TABLET | Refills: 0 | Status: SHIPPED | OUTPATIENT
Start: 2024-10-23

## 2024-10-23 NOTE — TELEPHONE ENCOUNTER
Care Due:                  Date            Visit Type   Department     Provider  --------------------------------------------------------------------------------                                ESTABLISHED                              PATIENT -    [facility]  Last Visit: 10-      [facility]           LEONARD KONG  Next Visit: None Scheduled  None         None Found                                                            Last  Test          Frequency    Reason                     Performed    Due Date  --------------------------------------------------------------------------------    Office Visit  15 months..  loratadine, traZODone....  10-   12-    [facility] Embedded Care Due Messages. Reference number: [id number].   10/23/2024 10:08:42 AM CDT

## 2024-10-23 NOTE — TELEPHONE ENCOUNTER
Provider Staff:  Action required for this patient    Requires appointment      Please see care gap opportunities below in Care Due Message.    Thanks!  [facility]     Appointments      Date Provider   Last Visit   10/6/2023 Chris Ley MD   Next Visit   Visit date not found Chris Ley MD     Refill Decision Note   Jayla Azevedo  is requesting a refill authorization.  Brief Assessment and Rationale for Refill:  Approve     Medication Therapy Plan:         Comments:     Note composed:5:55 PM 10/23/2024

## 2025-05-22 ENCOUNTER — PATIENT OUTREACH (OUTPATIENT)
Dept: ADMINISTRATIVE | Facility: HOSPITAL | Age: 35
End: 2025-05-22
Payer: MEDICAID

## 2025-05-22 DIAGNOSIS — Z13.220 SCREENING CHOLESTEROL LEVEL: Primary | ICD-10-CM
